# Patient Record
Sex: FEMALE | Race: ASIAN | NOT HISPANIC OR LATINO | URBAN - METROPOLITAN AREA
[De-identification: names, ages, dates, MRNs, and addresses within clinical notes are randomized per-mention and may not be internally consistent; named-entity substitution may affect disease eponyms.]

---

## 2021-10-14 ENCOUNTER — OFFICE VISIT - RIVER FALLS (OUTPATIENT)
Dept: FAMILY MEDICINE | Facility: CLINIC | Age: 41
End: 2021-10-14

## 2021-10-14 ASSESSMENT — MIFFLIN-ST. JEOR: SCORE: 1145.78

## 2021-10-18 LAB
BUN SERPL-MCNC: 21 MG/DL (ref 7–25)
BUN/CREAT RATIO - HISTORICAL: NORMAL (ref 6–22)
CALCIUM SERPL-MCNC: 10.1 MG/DL (ref 8.6–10.2)
CHLORIDE BLD-SCNC: 102 MMOL/L (ref 98–110)
CO2 SERPL-SCNC: 26 MMOL/L (ref 20–32)
CREAT SERPL-MCNC: 0.57 MG/DL (ref 0.5–1.1)
EGFRCR SERPLBLD CKD-EPI 2021: 115 ML/MIN/1.73M2
GLUCOSE BLD-MCNC: 82 MG/DL (ref 65–99)
LDLC SERPL CALC-MCNC: 72 MG/DL
POTASSIUM BLD-SCNC: 4.5 MMOL/L (ref 3.5–5.3)
SODIUM SERPL-SCNC: 139 MMOL/L (ref 135–146)

## 2021-10-19 LAB — HPV HIGH RISK: NOT DETECTED

## 2021-10-26 ENCOUNTER — COMMUNICATION - RIVER FALLS (OUTPATIENT)
Dept: FAMILY MEDICINE | Facility: CLINIC | Age: 41
End: 2021-10-26

## 2021-11-16 ENCOUNTER — COMMUNICATION - RIVER FALLS (OUTPATIENT)
Dept: FAMILY MEDICINE | Facility: CLINIC | Age: 41
End: 2021-11-16

## 2022-02-11 VITALS
DIASTOLIC BLOOD PRESSURE: 60 MMHG | HEART RATE: 83 BPM | HEIGHT: 61 IN | SYSTOLIC BLOOD PRESSURE: 110 MMHG | WEIGHT: 120.9 LBS | BODY MASS INDEX: 22.83 KG/M2 | OXYGEN SATURATION: 98 % | RESPIRATION RATE: 16 BRPM

## 2022-02-16 NOTE — PROGRESS NOTES
Chief Complaint    Annual Physical, stopped oral birth control 2 months ago. Due for pap and mammogram  History of Present Illness      Pt here today for annual exam      new to clinic      has been a few years since last wellness visit      Review of systems is negative except as per HPI including no fevers, chills, sore throat, runny nose, nausea, vomiting, constipation, diarrhea, rash or new skin lesions, chest pain, palpitations, slurred speech, new paresthesia, shortness of breath or wheeze.             Exam:      see vitals listed below      General: alert and oriented ×3 no acute distress.      HEENT: Normocephalic and atraumatic.       Eyes pupils are equal round and reactive to light extraocular motion is intact. normal conjunctiva      Hearing is grossly normal and there is no otorrhea. Tympanic membranes are pearly grey with a normal light reflex.      Nares are patent there is no rhinorrhea.       Mucous membranes are moist and pink.      Chest: has bilateral rise with no increased work of breathing. clear to auscultation without wheezes, rhonchi, or rales.      Cardiovascular: normal perfusion and brisk capillary refill. S1S2 with regular rate and rhythm and no murmurs, gallops or rubs.      Musculoskeletal: no gross focal abnormalities and normal gait.      Neuro: no gross focal abnormalities and memory seems intact.  CN 2-12 are grossly intact.      Psychiatric: speech is clear and coherent and fluent. Patient dressed appropriately for the weather. Mood is appropriate and affect is full.      Abd: no rebound or guarding, normal BS      Skin: no rash or lesion identified      Breast:  Pt declined             : atrophic external female genitalia. Vagina has no lesions,  there is normal physiological secretions present,  there is no odor, no foreign body noticed.       Vulva has no lesions. Mucosa appears atrophic. Cervix appears normal. Specimens obtained as noted in orders. Clitoral johnson appears  normal and there are mild left labial adhesions.             Discussed:      using sunscreen, protecting from sunburn, regular self skin checks      refer to usdachoosemyplate.gov, AHA and ADA for diet and exercise recommendations      consume [id number] mg calcium daily      do weight bearing exercises      can get mammo at hospital by calling up and scheduling, do not need an order from me      get 120min /week of aerobic exercise      may try to get shingrix from local pharmacy, she is a candidate but there is a national shortage      up to date on colon cancer screening         Physical Exam   Vitals & Measurements    HR: 83 (Peripheral)  RR: 16  BP: 110/60  SpO2: 98%     HT: 61 in  WT: 120.9 lb  BMI: 22.84   Assessment/Plan       Cervical cancer screening (Z12.4)       Screening for diabetes mellitus (Z13.1)       Screening for hyperlipidemia (Z13.220)       Well adult exam (Z00.00)      nonfasting labs for screening today  Patient Information     Name:AUGUSTA BRUNSON      Address:      [address] [place] [id number]     Sex:Female     YOB: 1980     [phone]     Emergency Contact:LILIANA BRUNSON     MRN:[id number]     FIN:[id number]     Location:[facility]     Date of Service:10/14/2021      Primary Care Physician:       NONE ,       Attending Physician:       Frank Gonzalez MDica, [phone]  Problem List/Past Medical History    Ongoing     No qualifying data    Historical     No qualifying data  Medications   No active medications  Allergies    No Known Medication Allergies  Social History     Electronic Cigarette/Vaping      Electronic Cigarette Use: Never.     Tobacco      Never (less than 100 in lifetime)  Immunizations   No Immunizations recorded for patient.

## 2022-02-16 NOTE — LETTER
(Inserted Image. Unable to display)            November 16, 2021        AUGUSTA BRUNSON  [address] [place] [address]        Dear AUGUSTA,     Thank you for selecting [facility] for your healthcare needs. Below you will find the results of your recent test(s) done at our clinic.      Your pap smear cells look normal and you do not have high risk HPV, so your next pap smear should be in 5 years.        Result Name Current Result Reference Range   Statement of Adequacy:  Satisfactory for evaluation. Endocervical/transformation zone component present. Age and/or menstrual status not provided 10/14/2021    Interpretation/Result:  Negative for intraepithelial lesion or malignancy. 10/14/2021    HPV High Risk  Not Detected 10/14/2021 Not Detected -      Please contact my practice at [phone] if you have any questions or concerns.     Sincerely,        Maite Gonzalez MD      What do your labs mean?  Below is a glossary of commonly ordered labs:  LDL   Bad Cholesterol   HDL   Good Cholesterol  AST/ALT   Liver Function   Cr/Creatinine   Kidney Function  Microalbumin   Kidney Function  BUN   Kidney Function  PSA   Prostate    TSH   Thyroid Hormone  HgbA1c   Diabetes Test   Hgb (Hemoglobin)   Red Blood Cells

## 2022-02-16 NOTE — NURSING NOTE
Comprehensive Intake Entered On:  10/14/2021 9:31 AM CDT    Performed On:  10/14/2021 9:25 AM CDT by Anne Jones               Summary   Chief Complaint :   Annual Physical, stopped oral birth control 2 months ago. Due for pap and mammogram   Last Menstrual Period :   10/5/2021 CDT   Menstrual Status :   Menarcheal   Weight Measured :   120.9 lb(Converted to: 120 lb 14 oz, 54.839 kg)    Height Measured :   61 in(Converted to: 5 ft 1 in, 154.94 cm)    Body Mass Index :   22.84 kg/m2   Body Surface Area :   1.53 m2   Systolic Blood Pressure :   110 mmHg   Diastolic Blood Pressure :   60 mmHg   Mean Arterial Pressure :   77 mmHg   Peripheral Pulse Rate :   83 bpm   BP Site :   Right arm   BP Method :   Manual   Respiratory Rate :   16 br/min   Oxygen Saturation :   98 %   Anne Jones - 10/14/2021 9:25 AM CDT   Health Status   Allergies Verified? :   Yes   Medication History Verified? :   Yes   Pre-Visit Planning Status :   Completed   Anne Jones - 10/14/2021 9:25 AM CDT   Consents   Consent for Immunization Exchange :   Consent Granted   Consent for Immunizations to Providers :   Consent Granted   Anne Jones - 10/14/2021 9:25 AM CDT   Meds / Allergies   (As Of: 10/14/2021 9:31:50 AM CDT)   Allergies (Active)   No Known Medication Allergies  Estimated Onset Date:   Unspecified ; Created By:   Anne Jones; Reaction Status:   Active ; Category:   Drug ; Substance:   No Known Medication Allergies ; Type:   Allergy ; Updated By:   Anne Jones; Reviewed Date:   10/14/2021 9:28 AM CDT        Medication List   (As Of: 10/14/2021 9:31:50 AM CDT)        Social History   Social History   (As Of: 10/14/2021 9:31:50 AM CDT)   Tobacco:        Never (less than 100 in lifetime)   (Last Updated: 10/14/2021 9:27:51 AM CDT by Anne Jones)          Electronic Cigarette/Vaping:        Electronic Cigarette Use: Never.   (Last Updated: 10/14/2021 9:27:56 AM CDT by Anne Jones)

## 2022-02-16 NOTE — LETTER
(Inserted Image. Unable to display)            October 26, 2021        AUGUSTA BRUNSON  [address] [place] [address]        Dear AUGUSTA,     Thank you for selecting [facility] for your healthcare needs. Below you will find the results of your recent test(s) done at our clinic.      This looks good!      Result Name Current Result Reference Range   Glucose Level (mg/dL)  82 10/14/2021 65 - 99   BUN (mg/dL)  21 10/14/2021 7 - 25   Creatinine Level (mg/dL)  0.57 10/14/2021 0.50 - 1.10   eGFR (mL/min/1.73m2)  115 10/14/2021 > OR = 60 -    eGFR  (mL/min/1.73m2)  133 10/14/2021 > OR = 60 -    BUN/Creat Ratio  NOT APPLICABLE 10/14/2021 6 - 22   Sodium Level (mmol/L)  139 10/14/2021 135 - 146   Potassium Level (mmol/L)  4.5 10/14/2021 3.5 - 5.3   Chloride Level (mmol/L)  102 10/14/2021 98 - 110   CO2 Level (mmol/L)  26 10/14/2021 20 - 32   Calcium Level (mg/dL)  10.1 10/14/2021 8.6 - 10.2   LDL Direct (mg/dL)  72 10/14/2021  - <100     Please contact my practice at [phone] if you have any questions or concerns.     Sincerely,        Maite Gonzalez MD      What do your labs mean?  Below is a glossary of commonly ordered labs:  LDL   Bad Cholesterol   HDL   Good Cholesterol  AST/ALT   Liver Function   Cr/Creatinine   Kidney Function  Microalbumin   Kidney Function  BUN   Kidney Function  PSA   Prostate    TSH   Thyroid Hormone  HgbA1c   Diabetes Test   Hgb (Hemoglobin)   Red Blood Cells

## 2022-02-16 NOTE — NURSING NOTE
Depression Screening Entered On:  12/10/2021 11:34 AM CST    Performed On:  10/16/2021 11:33 AM CDT by Lilo Desai               Depression Screening   Little Interest - Pleasure in Activities :   Several days   Feeling Down, Depressed, Hopeless :   Not at all   Initial Depression Screen Score :   1 Score   Poor Appetite or Overeating :   Not at all   Trouble Falling or Staying Asleep :   Several days   Feeling Tired or Little Energy :   Several days   Feeling Bad About Yourself :   Not at all   Trouble Concentrating :   Not at all   Moving or Speaking Slowly :   Not at all   Thoughts Better Off Dead or Hurting Self :   Not at all   Difficulty at Work, Home, Getting Along :   Not difficult at all   Detailed Depression Screen Score :   2    Total Depression Screen Score :   3    Lilo Desai - 12/10/2021 11:33 AM CST

## 2022-04-08 ENCOUNTER — OFFICE VISIT (OUTPATIENT)
Dept: FAMILY MEDICINE | Facility: CLINIC | Age: 42
End: 2022-04-08

## 2022-04-08 VITALS
DIASTOLIC BLOOD PRESSURE: 60 MMHG | SYSTOLIC BLOOD PRESSURE: 90 MMHG | WEIGHT: 125.5 LBS | BODY MASS INDEX: 23.7 KG/M2 | HEIGHT: 61 IN | HEART RATE: 72 BPM | OXYGEN SATURATION: 99 %

## 2022-04-08 DIAGNOSIS — L70.0 ACNE VULGARIS: ICD-10-CM

## 2022-04-08 DIAGNOSIS — Z30.011 ENCOUNTER FOR INITIAL PRESCRIPTION OF CONTRACEPTIVE PILLS: Primary | ICD-10-CM

## 2022-04-08 PROCEDURE — 99213 OFFICE O/P EST LOW 20 MIN: CPT | Performed by: FAMILY MEDICINE

## 2022-04-08 RX ORDER — DROSPIRENONE AND ETHINYL ESTRADIOL 0.02-3(28)
1 KIT ORAL DAILY
Qty: 84 TABLET | Refills: 3 | Status: SHIPPED | OUTPATIENT
Start: 2022-04-08 | End: 2023-03-15

## 2022-04-08 NOTE — PROGRESS NOTES
"  Assessment & Plan   Problem List Items Addressed This Visit     None      Visit Diagnoses     Encounter for initial prescription of contraceptive pills    -  Primary    Relevant Medications    drospirenone-ethinyl estradiol (JUSTINE) 3-0.02 MG tablet    Acne vulgaris        Relevant Medications    drospirenone-ethinyl estradiol (JUSTINE) 3-0.02 MG tablet       follow  Up in 3 months if acne and cycles are not well controlled. Sooner if needed                   Maite Gonzalez MD  [facility]    Jose Plummer is a 42 year old who presents for the following health issues     HPI   Patient present to clinic today to discuss contraception.  She decided to stop OCP because she and her  seldom are sexually active as there is a 6 year old in their bed and a 9 year old some nights also.  She has noticed that her cycles are more painful and her acne is getting bad.  She has been using condoms for contraception.  She denies possibility of pregnancy and declines UPT today.  Is aware of various alternatives for contraception including nexplanon, OCP, nuvaring, patch, Depo Provera, IUD and IUS, NFP, diaphragm, condoms, abstinence and sterilization. She is aware of risks associated with estrogen including stroke, DVT, PE, CVA, MI and would like to proceed with OCP.    Also counseled patient that all patients of reproductive age should be taking 400 mcg folic acid daily to reduce risks of 2 birth defects and that condoms are needed to reduce risk of STD.          Review of Systems         Objective    BP 90/60 (BP Location: Right arm, Patient Position: Sitting)   Pulse 72   Ht 1.549 m (5' 1\")   Wt 56.9 kg (125 lb 8 oz)   LMP 03/18/2022   SpO2 99%   BMI 23.71 kg/m    Body mass index is 23.71 kg/m .  Physical Exam                   "

## 2022-05-14 ENCOUNTER — HEALTH MAINTENANCE LETTER (OUTPATIENT)
Age: 42
End: 2022-05-14

## 2022-09-03 ENCOUNTER — HEALTH MAINTENANCE LETTER (OUTPATIENT)
Age: 42
End: 2022-09-03

## 2023-06-03 ENCOUNTER — HEALTH MAINTENANCE LETTER (OUTPATIENT)
Age: 43
End: 2023-06-03

## 2024-02-17 ENCOUNTER — HEALTH MAINTENANCE LETTER (OUTPATIENT)
Age: 44
End: 2024-02-17

## 2024-07-06 ENCOUNTER — HEALTH MAINTENANCE LETTER (OUTPATIENT)
Age: 44
End: 2024-07-06